# Patient Record
Sex: MALE
[De-identification: names, ages, dates, MRNs, and addresses within clinical notes are randomized per-mention and may not be internally consistent; named-entity substitution may affect disease eponyms.]

---

## 2020-01-22 PROBLEM — Z00.00 ENCOUNTER FOR PREVENTIVE HEALTH EXAMINATION: Status: ACTIVE | Noted: 2020-01-22

## 2020-01-23 ENCOUNTER — APPOINTMENT (OUTPATIENT)
Dept: COLORECTAL SURGERY | Facility: CLINIC | Age: 60
End: 2020-01-23
Payer: COMMERCIAL

## 2020-01-23 VITALS — RESPIRATION RATE: 14 BRPM | HEART RATE: 73 BPM | DIASTOLIC BLOOD PRESSURE: 76 MMHG | SYSTOLIC BLOOD PRESSURE: 136 MMHG

## 2020-01-23 DIAGNOSIS — B20 HUMAN IMMUNODEFICIENCY VIRUS [HIV] DISEASE: ICD-10-CM

## 2020-01-23 DIAGNOSIS — Z12.11 ENCOUNTER FOR SCREENING FOR MALIGNANT NEOPLASM OF COLON: ICD-10-CM

## 2020-01-23 DIAGNOSIS — J45.909 UNSPECIFIED ASTHMA, UNCOMPLICATED: ICD-10-CM

## 2020-01-23 PROCEDURE — 99204 OFFICE O/P NEW MOD 45 MIN: CPT | Mod: 25

## 2020-01-23 PROCEDURE — 46600 DIAGNOSTIC ANOSCOPY SPX: CPT

## 2020-01-23 NOTE — PHYSICAL EXAM
[Tender, Swollen] : tender, swollen [Respiratory Effort] : normal respiratory effort [Skin Tags] : residual hemorrhoidal skin tags were noted [Normal Rate and Rhythm] : normal rate and rhythm [No Rash or Lesion] : No rash or lesion [Alert] : alert [Oriented to Place] : oriented to place [Oriented to Person] : oriented to person [Calm] : calm [Abdomen Masses] : No abdominal masses [Tender] : nontender [JVD] : no jugular venous distention  [de-identified] : No perianal condylomas identified, mucosa normal [de-identified] : NAD [de-identified] :  normocephalic, atraumatic.

## 2020-01-23 NOTE — HISTORY OF PRESENT ILLNESS
[FreeTextEntry1] : 59-year-old Creole speaking male with a history of HIV for over 20 years well controlled on antiretrovirals presents for consultation after anal Pap smear which demonstrated atypical squamous cell of undetermined significance. The patient has also never had a colonoscopy for screening for colon cancer. He denies a family history of colon cancer, inflammatory bowel disease or polyps. He denies any rectal bleeding or rectal lesions.  was used throughout the visit.

## 2020-01-23 NOTE — ASSESSMENT
[FreeTextEntry1] : 59-year-old male with a long history of HIV and anal Pap smear demonstrating atypical squamous cells. I recommend high-definition endoscopy with possible biopsy. He has no lesions that are concerning for condyloma. I also recommend a colonoscopy since he's never had one.\par \par The risks and benefits of the high-definition anoscopy with biopsy were discussed with the patient in detail. The risks include, but not limited to, bleeding, infection, injury to surrounding structures, recurrence and progression of disease. The patient understands and wishes to proceed with high definition endoscopy with biopsy.\par \par The risks/benefits/alternatives for a colonoscopy were discussed. The risks and benefits of colorectal cancer screening with colonoscopy was discussed including, but not limited to, bleeding, infection, perforation of colon, injury to surrounding organs and missed polyp or cancer. These include a less than 1% risk of bleeding should any polyps be biopsied and/or removed. There is also a less than 0.1% risk of perforation. The patient understands the need to adhere to a clear liquid diet the day prior to procedure as well as having to perform a bowel prep (Miralax) in order to allow for adequate visualization of the mucosal surfaces. Followup colonoscopies will be scheduled based on the findings that are seen at the time of the procedure. Patient understands and is agreeable, and will proceed with consent and scheduling.\par \par Patient will need the following preoperative testings: \par CD4, viral load\par \par Patient will need the following preoperative risk assessment evaluation:\par internal medicine\par \par please use  phone patient speaks creole

## 2021-02-19 ENCOUNTER — LABORATORY RESULT (OUTPATIENT)
Age: 61
End: 2021-02-19

## 2021-02-19 ENCOUNTER — APPOINTMENT (OUTPATIENT)
Dept: COLORECTAL SURGERY | Facility: CLINIC | Age: 61
End: 2021-02-19
Payer: COMMERCIAL

## 2021-02-19 VITALS
RESPIRATION RATE: 15 BRPM | TEMPERATURE: 98.3 F | SYSTOLIC BLOOD PRESSURE: 143 MMHG | HEART RATE: 77 BPM | OXYGEN SATURATION: 98 % | WEIGHT: 190 LBS | DIASTOLIC BLOOD PRESSURE: 90 MMHG

## 2021-02-19 DIAGNOSIS — R85.610 ATYPICAL SQUAMOUS CELLS OF UNDETERMINED SIGNIFICANCE ON CYTOLOGIC SMEAR OF ANUS (ASC-US): ICD-10-CM

## 2021-02-19 PROCEDURE — 99213 OFFICE O/P EST LOW 20 MIN: CPT | Mod: 25

## 2021-02-19 PROCEDURE — 99072 ADDL SUPL MATRL&STAF TM PHE: CPT

## 2021-02-19 PROCEDURE — 46600 DIAGNOSTIC ANOSCOPY SPX: CPT

## 2021-02-19 NOTE — CONSULT LETTER
[Dear  ___] : Dear  [unfilled], [Consult Letter:] : I had the pleasure of evaluating your patient, [unfilled]. [Please see my note below.] : Please see my note below. [Consult Closing:] : Thank you very much for allowing me to participate in the care of this patient.  If you have any questions, please do not hesitate to contact me. [Sincerely,] : Sincerely, [FreeTextEntry3] : Gabrielle Roe MD\par

## 2021-02-19 NOTE — HISTORY OF PRESENT ILLNESS
[FreeTextEntry1] : 59-year-old Creole speaking male with a history of HIV for over 20 years well controlled on antiretrovirals presents for consultation after anal Pap smear which demonstrated atypical squamous cell of undetermined significance. The patient has also never had a colonoscopy for screening for colon cancer. He denies a family history of colon cancer, inflammatory bowel disease or polyps. He denies any rectal bleeding or rectal lesions.  was used throughout the visit. \par \par 2/19/21 Mr. Munoz presents to the office for followup. Since his last office visit, he dd not schedule for a colonoscopy as recommended. He also did not undergo HRA. Returns now for anal pap. He denies any rectal bleeding or anorectal pain. BMs are passed regularly and without issue.

## 2021-02-19 NOTE — PHYSICAL EXAM
[Tender, Swollen] : tender, swollen [Skin Tags] : residual hemorrhoidal skin tags were noted [Respiratory Effort] : normal respiratory effort [Normal Rate and Rhythm] : normal rate and rhythm [No Rash or Lesion] : No rash or lesion [Alert] : alert [Oriented to Person] : oriented to person [Oriented to Place] : oriented to place [Calm] : calm [Normal rectal exam] : exam was normal [Reduce Spontaneously] : a spontaneously reducible (grade II) [Normal] : was normal [None] : there was no rectal mass  [Abdomen Masses] : No abdominal masses [Tender] : nontender [Gross Blood] : no gross blood [JVD] : no jugular venous distention  [de-identified] : No perianal condylomas identified, mucosa normal [de-identified] : circumferential nonedematous [de-identified] : NAD [de-identified] :  normocephalic, atraumatic.

## 2021-02-19 NOTE — ASSESSMENT
[FreeTextEntry1] : 59-year-old male with a long history of HIV and anal Pap smear demonstrating atypical squamous cells.\par Repeat anal pap performed today. Based on results, will plan for either yearly pap versus HRA.